# Patient Record
Sex: MALE | Race: WHITE | Employment: FULL TIME | URBAN - METROPOLITAN AREA
[De-identification: names, ages, dates, MRNs, and addresses within clinical notes are randomized per-mention and may not be internally consistent; named-entity substitution may affect disease eponyms.]

---

## 2017-09-25 ENCOUNTER — TELEPHONE (OUTPATIENT)
Dept: FAMILY MEDICINE CLINIC | Facility: CLINIC | Age: 37
End: 2017-09-25

## 2017-09-25 NOTE — TELEPHONE ENCOUNTER
Spoke to patient and received and verified his [employer] [id number] Id # and group #[id number]

## 2018-01-25 ENCOUNTER — OFFICE VISIT (OUTPATIENT)
Dept: FAMILY MEDICINE CLINIC | Facility: CLINIC | Age: 38
End: 2018-01-25

## 2018-01-25 VITALS
DIASTOLIC BLOOD PRESSURE: 80 MMHG | WEIGHT: 225 LBS | HEART RATE: 77 BPM | RESPIRATION RATE: 16 BRPM | TEMPERATURE: 98 F | OXYGEN SATURATION: 98 % | HEIGHT: 72 IN | SYSTOLIC BLOOD PRESSURE: 126 MMHG | BODY MASS INDEX: 30.48 KG/M2

## 2018-01-25 DIAGNOSIS — J02.9 SORE THROAT: ICD-10-CM

## 2018-01-25 DIAGNOSIS — J06.9 VIRAL URI WITH COUGH: Primary | ICD-10-CM

## 2018-01-25 PROCEDURE — 87880 STREP A ASSAY W/OPTIC: CPT | Performed by: PHYSICIAN ASSISTANT

## 2018-01-25 PROCEDURE — 99213 OFFICE O/P EST LOW 20 MIN: CPT | Performed by: PHYSICIAN ASSISTANT

## 2018-01-25 RX ORDER — BENZONATATE 200 MG/1
200 CAPSULE ORAL 3 TIMES DAILY PRN
Qty: 30 CAPSULE | Refills: 0 | Status: SHIPPED | OUTPATIENT
Start: 2018-01-25 | End: 2018-02-04

## 2018-01-25 NOTE — PATIENT INSTRUCTIONS
-Cool mist humidifier at night  -Warm tea with honey  -Mucinex-D  -Flonase  -Motrin for pain or fever  -go to ER with any worsening symptoms            Viral Upper Respiratory Illness (Adult)  You have a viral upper respiratory illness (URI), which is anot Follow-up care  Follow up with your healthcare provider, or as advised.   When to seek medical advice  Call your healthcare provider right away if any of these occur:  · Cough with lots of colored sputum (mucus)  · Severe headache; face, neck, or ear pain

## 2018-01-25 NOTE — PROGRESS NOTES
CHIEF COMPLAINT:   Patient presents with:  Chest Congestion: x 4 days, cough, nasal congestion, pt's son has croup dx 3 days ago      HPI:   Rajendra Patterson is a 44year old male who presents for URI sxs for  3-4 days.   Patient reports body aches, swe GI: denies N/V/C or abdominal pain  NEURO: + slight headaches    EXAM:   /80 (BP Location: Right arm, Patient Position: Sitting, Cuff Size: large)   Pulse 77   Temp 98 °F (36.7 °C) (Oral)   Resp 16   Ht 72\"   Wt 225 lb   SpO2 98%   BMI 30.52 kg/m² -Motrin for pain or fever  -go to ER with any worsening symptoms            Viral Upper Respiratory Illness (Adult)  You have a viral upper respiratory illness (URI), which is another term for the common cold.  This illness is contagious during the first fe When to seek medical advice  Call your healthcare provider right away if any of these occur:  · Cough with lots of colored sputum (mucus)  · Severe headache; face, neck, or ear pain  · Difficulty swallowing due to throat pain  · Fever of 100.4°F (38°C) or

## 2018-03-05 LAB — CONTROL LINE PRESENT WITH A CLEAR BACKGROUND (YES/NO): YES YES/NO

## 2018-05-16 ENCOUNTER — HOSPITAL ENCOUNTER (EMERGENCY)
Age: 38
Discharge: HOME | End: 2018-05-16
Payer: COMMERCIAL

## 2018-05-16 ENCOUNTER — APPOINTMENT (OUTPATIENT)
Dept: GENERAL RADIOLOGY | Age: 38
End: 2018-05-16
Attending: NURSE PRACTITIONER
Payer: COMMERCIAL

## 2018-05-16 VITALS
TEMPERATURE: 99 F | WEIGHT: 220 LBS | HEART RATE: 72 BPM | HEIGHT: 73 IN | OXYGEN SATURATION: 99 % | SYSTOLIC BLOOD PRESSURE: 161 MMHG | RESPIRATION RATE: 16 BRPM | DIASTOLIC BLOOD PRESSURE: 88 MMHG | BODY MASS INDEX: 29.16 KG/M2

## 2018-05-16 DIAGNOSIS — S43.002A SUBLUXATION OF LEFT SHOULDER JOINT, INITIAL ENCOUNTER: Primary | ICD-10-CM

## 2018-05-16 PROCEDURE — 73050 X-RAY EXAM OF SHOULDERS: CPT | Performed by: NURSE PRACTITIONER

## 2018-05-16 PROCEDURE — 73030 X-RAY EXAM OF SHOULDER: CPT | Performed by: NURSE PRACTITIONER

## 2018-05-16 PROCEDURE — 99283 EMERGENCY DEPT VISIT LOW MDM: CPT

## 2018-05-16 RX ORDER — IBUPROFEN 400 MG/1
400 TABLET ORAL EVERY 6 HOURS PRN
COMMUNITY

## 2018-05-16 NOTE — ED PROVIDER NOTES
Patient Seen in: [facility] In [place]    History   Patient presents with:  Upper Extremity Injury (musculoskeletal)    Stated Complaint: L shoulder inj    HPI  Patient is a 71-year-old gentleman who presents with left anterior should Neck: Normal range of motion. Neck supple. Pulmonary/Chest: Effort normal.   Musculoskeletal: He exhibits tenderness (very mild).         Arms:  Left Shoulder Exam:   - Gross deformity: None  - Discoloration: None  - ROM: Normal abduction, flexion, extens

## 2018-05-16 NOTE — ED INITIAL ASSESSMENT (HPI)
Yesterday while helping at son's baseball practice- reached while fielding a ball and fell onto l arm-- now c/o pain to l shoulder

## 2019-11-20 ENCOUNTER — TELEPHONE (OUTPATIENT)
Dept: FAMILY MEDICINE CLINIC | Facility: CLINIC | Age: 39
End: 2019-11-20

## 2019-11-20 NOTE — TELEPHONE ENCOUNTER
LMOM for pt to call back confirming if he will continue care with Dr Tina Bailey ([address] [place] [address] 2/2/15)

## 2020-04-27 ENCOUNTER — TELEPHONE (OUTPATIENT)
Dept: FAMILY MEDICINE CLINIC | Facility: CLINIC | Age: 40
End: 2020-04-27

## 2020-04-27 NOTE — TELEPHONE ENCOUNTER
Tried returning call to Malou Macedo at [phone]. There was no answer and no voice mail has been set up. Called Bipin Piedra at the home number [phone].   There was no answer

## 2020-04-27 NOTE — TELEPHONE ENCOUNTER
Patient's wife called, has questions regarding Covid as someone in patient's office has tested positive, wants to know steps and precautions to take

## 2022-09-26 ENCOUNTER — TELEPHONE (OUTPATIENT)
Dept: FAMILY MEDICINE CLINIC | Facility: CLINIC | Age: 42
End: 2022-09-26

## 2022-09-26 NOTE — TELEPHONE ENCOUNTER
Received fax from [facility] requesting additional information faxed back to them at [phone].  Fax placed in triage

## 2024-02-01 PROBLEM — K58.9 IRRITABLE BOWEL SYNDROME WITHOUT DIARRHEA: Status: ACTIVE | Noted: 2024-02-01

## 2024-02-01 PROBLEM — R10.84 PAIN, ABDOMINAL, GENERALIZED: Status: ACTIVE | Noted: 2024-02-01

## 2024-02-03 ENCOUNTER — TELEPHONE (OUTPATIENT)
Dept: FAMILY MEDICINE CLINIC | Facility: CLINIC | Age: 44
End: 2024-02-03

## 2024-02-03 DIAGNOSIS — I10 ESSENTIAL HYPERTENSION: ICD-10-CM

## 2024-02-03 DIAGNOSIS — Z13.1 SCREENING FOR DIABETES MELLITUS: Primary | ICD-10-CM

## 2024-02-03 DIAGNOSIS — Z13.29 SCREENING FOR THYROID DISORDER: ICD-10-CM

## 2024-02-03 DIAGNOSIS — Z13.6 SCREENING FOR CARDIOVASCULAR CONDITION: ICD-10-CM

## 2024-02-03 DIAGNOSIS — Z13.0 SCREENING FOR IRON DEFICIENCY ANEMIA: ICD-10-CM

## 2024-02-03 DIAGNOSIS — Z11.59 ENCOUNTER FOR HEPATITIS C SCREENING TEST FOR LOW RISK PATIENT: ICD-10-CM

## 2024-02-03 DIAGNOSIS — Z00.00 LABORATORY EXAMINATION ORDERED AS PART OF A ROUTINE GENERAL MEDICAL EXAMINATION: ICD-10-CM

## 2024-02-03 NOTE — TELEPHONE ENCOUNTER
Please enter lab orders for the patient's upcoming physical appointment.     Physical scheduled:   Your appointments       Date & Time Appointment Department ([place])    Mar 08, 2024 11:00 AM CST Adult Physical with Bipin Steven MD [facility] ([facility])    PLEASE NOTE - Most insurances allow a Complete Physical once every 366 days. Please schedule accordingly.    Please arrive 15 minutes prior to your scheduled appointment. Please also bring your Insurance card, Photo ID, and your medication bottles or a list of your current medication.    If you no longer require this appointment, please contact your physician office to cancel.              [facility]  [address] [place] [id number] [phone]           Preferred lab: [facility] ([facility])     The patient has been notified to complete fasting labs prior to their physical appointment.

## 2024-02-03 NOTE — TELEPHONE ENCOUNTER
1. Screening for diabetes mellitus (Primary)  -     Comp Metabolic Panel (14); Future; Expected date: 02/03/2024  2. Screening for iron deficiency anemia  -     CBC With Differential With Platelet; Future; Expected date: 02/03/2024  3. Screening for thyroid disorder  -     TSH W Reflex To Free T4; Future; Expected date: 02/03/2024  4. Screening for cardiovascular condition  -     Lipid Panel; Future; Expected date: 02/03/2024  5. Encounter for hepatitis C screening test for low risk patient  -     HCV Antibody; Future; Expected date: 02/03/2024  6. Laboratory examination ordered as part of a routine general medical examination  -     TSH W Reflex To Free T4; Future; Expected date: 02/03/2024  -     Lipid Panel; Future; Expected date: 02/03/2024  -     CBC With Differential With Platelet; Future; Expected date: 02/03/2024  -     Comp Metabolic Panel (14); Future; Expected date: 02/03/2024  -     HCV Antibody; Future; Expected date: 02/03/2024  7. Essential hypertension  -     TSH W Reflex To Free T4; Future; Expected date: 02/03/2024  -     CBC With Differential With Platelet; Future; Expected date: 02/03/2024  -     Comp Metabolic Panel (14); Future; Expected date: 02/03/2024       OK to notify. Thanks, Jose Antonio Steven MD

## 2024-03-04 ENCOUNTER — LAB ENCOUNTER (OUTPATIENT)
Dept: LAB | Age: 44
End: 2024-03-04
Attending: FAMILY MEDICINE
Payer: COMMERCIAL

## 2024-03-04 DIAGNOSIS — Z13.1 SCREENING FOR DIABETES MELLITUS: ICD-10-CM

## 2024-03-04 DIAGNOSIS — Z11.59 ENCOUNTER FOR HEPATITIS C SCREENING TEST FOR LOW RISK PATIENT: ICD-10-CM

## 2024-03-04 DIAGNOSIS — Z00.00 LABORATORY EXAMINATION ORDERED AS PART OF A ROUTINE GENERAL MEDICAL EXAMINATION: ICD-10-CM

## 2024-03-04 DIAGNOSIS — Z13.6 SCREENING FOR CARDIOVASCULAR CONDITION: ICD-10-CM

## 2024-03-04 DIAGNOSIS — Z13.29 SCREENING FOR THYROID DISORDER: ICD-10-CM

## 2024-03-04 DIAGNOSIS — Z13.0 SCREENING FOR IRON DEFICIENCY ANEMIA: ICD-10-CM

## 2024-03-04 DIAGNOSIS — I10 ESSENTIAL HYPERTENSION: ICD-10-CM

## 2024-03-04 LAB
ALBUMIN SERPL-MCNC: 4.1 G/DL (ref 3.4–5)
ALBUMIN/GLOB SERPL: 1.5 {RATIO} (ref 1–2)
ALP LIVER SERPL-CCNC: 68 U/L
ALT SERPL-CCNC: 31 U/L
ANION GAP SERPL CALC-SCNC: 2 MMOL/L (ref 0–18)
AST SERPL-CCNC: 25 U/L (ref 15–37)
BASOPHILS # BLD AUTO: 0.02 X10(3) UL (ref 0–0.2)
BASOPHILS NFR BLD AUTO: 0.4 %
BILIRUB SERPL-MCNC: 1 MG/DL (ref 0.1–2)
BUN BLD-MCNC: 15 MG/DL (ref 9–23)
CALCIUM BLD-MCNC: 9.1 MG/DL (ref 8.5–10.1)
CHLORIDE SERPL-SCNC: 102 MMOL/L (ref 98–112)
CHOLEST SERPL-MCNC: 213 MG/DL (ref ?–200)
CO2 SERPL-SCNC: 29 MMOL/L (ref 21–32)
CREAT BLD-MCNC: 1.09 MG/DL
EGFRCR SERPLBLD CKD-EPI 2021: 86 ML/MIN/1.73M2 (ref 60–?)
EOSINOPHIL # BLD AUTO: 0.04 X10(3) UL (ref 0–0.7)
EOSINOPHIL NFR BLD AUTO: 0.8 %
ERYTHROCYTE [DISTWIDTH] IN BLOOD BY AUTOMATED COUNT: 12.4 %
FASTING PATIENT LIPID ANSWER: YES
FASTING STATUS PATIENT QL REPORTED: YES
GLOBULIN PLAS-MCNC: 2.7 G/DL (ref 2.8–4.4)
GLUCOSE BLD-MCNC: 100 MG/DL (ref 70–99)
HCT VFR BLD AUTO: 41.8 %
HCV AB SERPL QL IA: NONREACTIVE
HDLC SERPL-MCNC: 46 MG/DL (ref 40–59)
HGB BLD-MCNC: 14.3 G/DL
IMM GRANULOCYTES # BLD AUTO: 0.01 X10(3) UL (ref 0–1)
IMM GRANULOCYTES NFR BLD: 0.2 %
LDLC SERPL CALC-MCNC: 136 MG/DL (ref ?–100)
LYMPHOCYTES # BLD AUTO: 1.08 X10(3) UL (ref 1–4)
LYMPHOCYTES NFR BLD AUTO: 20.3 %
MCH RBC QN AUTO: 30.8 PG (ref 26–34)
MCHC RBC AUTO-ENTMCNC: 34.2 G/DL (ref 31–37)
MCV RBC AUTO: 90.1 FL
MONOCYTES # BLD AUTO: 0.39 X10(3) UL (ref 0.1–1)
MONOCYTES NFR BLD AUTO: 7.3 %
NEUTROPHILS # BLD AUTO: 3.77 X10 (3) UL (ref 1.5–7.7)
NEUTROPHILS # BLD AUTO: 3.77 X10(3) UL (ref 1.5–7.7)
NEUTROPHILS NFR BLD AUTO: 71 %
NONHDLC SERPL-MCNC: 167 MG/DL (ref ?–130)
OSMOLALITY SERPL CALC.SUM OF ELEC: 277 MOSM/KG (ref 275–295)
PLATELET # BLD AUTO: 203 10(3)UL (ref 150–450)
POTASSIUM SERPL-SCNC: 4.1 MMOL/L (ref 3.5–5.1)
PROT SERPL-MCNC: 6.8 G/DL (ref 6.4–8.2)
RBC # BLD AUTO: 4.64 X10(6)UL
SODIUM SERPL-SCNC: 133 MMOL/L (ref 136–145)
TRIGL SERPL-MCNC: 171 MG/DL (ref 30–149)
TSI SER-ACNC: 1.12 MIU/ML (ref 0.36–3.74)
VLDLC SERPL CALC-MCNC: 32 MG/DL (ref 0–30)
WBC # BLD AUTO: 5.3 X10(3) UL (ref 4–11)

## 2024-03-04 PROCEDURE — 86803 HEPATITIS C AB TEST: CPT | Performed by: FAMILY MEDICINE

## 2024-03-04 PROCEDURE — 80061 LIPID PANEL: CPT | Performed by: FAMILY MEDICINE

## 2024-03-04 PROCEDURE — 80050 GENERAL HEALTH PANEL: CPT | Performed by: FAMILY MEDICINE

## 2024-03-08 ENCOUNTER — OFFICE VISIT (OUTPATIENT)
Dept: FAMILY MEDICINE CLINIC | Facility: CLINIC | Age: 44
End: 2024-03-08
Payer: COMMERCIAL

## 2024-03-08 VITALS
SYSTOLIC BLOOD PRESSURE: 128 MMHG | WEIGHT: 231.13 LBS | HEART RATE: 82 BPM | BODY MASS INDEX: 30.63 KG/M2 | DIASTOLIC BLOOD PRESSURE: 86 MMHG | HEIGHT: 73 IN | RESPIRATION RATE: 16 BRPM

## 2024-03-08 DIAGNOSIS — Z00.00 ANNUAL PHYSICAL EXAM: Primary | ICD-10-CM

## 2024-03-08 DIAGNOSIS — I10 ESSENTIAL HYPERTENSION: ICD-10-CM

## 2024-03-08 DIAGNOSIS — F41.1 GAD (GENERALIZED ANXIETY DISORDER): ICD-10-CM

## 2024-03-08 RX ORDER — LORAZEPAM 0.5 MG/1
0.5 TABLET ORAL EVERY 6 HOURS PRN
Qty: 20 TABLET | Refills: 1 | Status: SHIPPED | OUTPATIENT
Start: 2024-03-08

## 2024-03-08 NOTE — PATIENT INSTRUCTIONS
Treating Insomnia     Learning to relax before bedtime can improve your sleep.   Good sleeping habits are a key part of treatment. If needed, some medicines may help you sleep better at first. Making healthy lifestyle changes and learning to relax can improve your sleep. Treating insomnia takes commitment. But trust that your efforts will pay off. Be sure to talk with your healthcare provider before taking any medicine.  Healthy lifestyle  Your lifestyle affects your health and your sleep. Here are some healthy habits:  Keep a regular sleep schedule. Go to bed and get up at the same time each day.  Exercise regularly. It may help you reduce stress. Avoid strenuous exercise for 2 to 4 hours before bedtime.  Avoid or limit naps, especially in the late afternoon.  Use your bed only for sleep and sex.  Don’t spend too much time in bed trying to fall asleep. If you can’t fall asleep, get up and do something (no electronics) until you become tired and drowsy.  Avoid or limit caffeine and nicotine for up to 6 hours before bedtime. They can keep you awake at night.   Also avoid alcohol for at least 4 to 6 hours before bedtime. It may help you fall asleep at first. But you will have more awakenings during the night. And your sleep will not be restful.  Before bedtime  To sleep better every night, try these tips:  Have a bedtime routine to let your body and mind know when it’s time to sleep.  Think of going to bed as relaxing and enjoyable. Sleep will come sooner.  If your worries don’t let you sleep, write them down in a diary. Then close it, and go to bed.  Make sure the room is not too hot or too cold. If it’s not dark enough, an eye mask can help. If it’s noisy, try using earplugs.  Don't eat a large meal just before bedtime.  Remove noises, bright lights, TVs, cell phones, and computers from your sleeping environment.  Use a comfortable mattress and pillow.  Learn to relax  Stress, anxiety, and body tension may keep  you awake at night. To unwind before bedtime, try a warm bath, meditation, or yoga. Also try the following:  Deep breathing. Sit or lie back in a chair. Take a slow, deep breath. Hold it for 5 counts. Then breathe out slowly through your mouth. Keep doing this until you feel relaxed.  Progressive muscle relaxation. Tense and then relax the muscles in your body as you breathe deeply. Start with your feet and work up your body to your neck and face.  Date Last Reviewed: 8/1/2017  © 6831-9921 [employer]. [address] [place] [address]. All rights reserved. This information is not intended as a substitute for professional medical care. Always follow your healthcare professional's instructions.

## 2024-03-08 NOTE — ASSESSMENT & PLAN NOTE
Last K was 4.1 done on 3/4/2024.  Last Cr was 1.09 done on 3/4/2024.  Last eGFR was 86 on 3/4/2024.

## 2024-03-08 NOTE — PROGRESS NOTES
Javi Pemberton is a 43 year old male who presents for a complete physical exam.     had concerns including Physical (Annual /), Lab (Would like to check and discuss blood work results for PSA ), and Anxiety (Would like to start discuss and see what are options ).   No topic due editable text      Subjective:    He complains of anxiety continues. 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     Tobacco:  He smoked tobacco in the past but quit greater than 12 months ago.  Social History    Tobacco Use      Smoking status: Former        Packs/day: 0.50        Years: 4.00        Additional pack years: 0.00        Total pack years: 2.00        Types: Cigarettes        Quit date: 2020        Years since quittin.1      Smokeless tobacco: Never        Wt Readings from Last 4 Encounters:   24 231 lb 1.6 oz (104.8 kg)   24 225 lb (102.1 kg)   23 222 lb (100.7 kg)   23 222 lb (100.7 kg)     Body mass index is 30.49 kg/m².     The 10-year ASCVD risk score (Will HENDERSON, et al., 2019) is: 2.2%    Values used to calculate the score:      Age: 43 years      Sex: Male      Is Non- : No      Diabetic: No      Tobacco smoker: No      Systolic Blood Pressure: 128 mmHg      Is BP treated: No      HDL Cholesterol: 46 mg/dL      Total Cholesterol: 213 mg/dL    Chief Complaint Reviewed and Verified  Nursing Notes Reviewed and   Verified  Tobacco Reviewed  Allergies Reviewed  Medications Reviewed    Problem List Reviewed  Medical History Reviewed  Surgical History   Reviewed  Family History Reviewed          His family history includes Hypertension in his mother; Prostate Cancer in his father.   He  reports that he quit smoking about 4 years ago. His smoking use included cigarettes. He has a 2 pack-year smoking history. He has never used smokeless tobacco. He reports current alcohol use of about 6.0 standard drinks of alcohol per week.  He reports that he does not use drugs.    Exercise: three times per week.  Diet: watches minimally    Health Maintenance   Topic Date Due    PSA  Never done      No results found for this or any previous visit.     Health Maintenance   Topic Date Due    Colorectal Cancer Screening  02/01/2034      Pneumococcal Vaccination(1 of 2 - PCV) Never done   Health Maintenance Due   Topic Date Due    Annual Physical  Never done    Pneumococcal Vaccine: Birth to 64yrs (1 of 2 - PCV) Never done    Zoster Vaccines (1 of 2) Never done    DTaP,Tdap,and Td Vaccines (3 - Tdap) 09/11/2013    COVID-19 Vaccine (4 - 2023-24 season) 09/01/2023    Influenza Vaccine (1) 10/01/2023    Annual Depression Screening  Never done         Review of Systems   Constitutional: Negative.  Negative for activity change, appetite change, chills and fever.   HENT: Negative.     Eyes: Negative.    Respiratory: Negative.  Negative for shortness of breath.    Cardiovascular: Negative.  Negative for chest pain and palpitations.   Gastrointestinal: Negative.  Negative for abdominal pain.   Genitourinary: Negative.  Negative for dysuria.   Musculoskeletal:  Negative for arthralgias.   Skin: Negative.  Negative for rash.   Allergic/Immunologic: Negative.    Neurological: Negative.         Results:    Lab Results   Component Value Date/Time    WBC 5.3 03/04/2024 11:04 AM    HGB 14.3 03/04/2024 11:04 AM    .0 03/04/2024 11:04 AM      Lab Results   Component Value Date/Time     (H) 03/04/2024 11:04 AM     (L) 03/04/2024 11:04 AM    K 4.1 03/04/2024 11:04 AM     03/04/2024 11:04 AM    CO2 29.0 03/04/2024 11:04 AM    CREATSERUM 1.09 03/04/2024 11:04 AM    CA 9.1 03/04/2024 11:04 AM    ALB 4.1 03/04/2024 11:04 AM    TP 6.8 03/04/2024 11:04 AM    ALKPHO 68 03/04/2024 11:04 AM    AST 25 03/04/2024 11:04 AM    ALT 31 03/04/2024 11:04 AM    BILT 1.0 03/04/2024 11:04 AM    TSH 1.120 03/04/2024 11:04 AM        Lab Results   Component Value  Date/Time    CHOLEST 213 (H) 03/04/2024 11:04 AM    HDL 46 03/04/2024 11:04 AM    TRIG 171 (H) 03/04/2024 11:04 AM     (H) 03/04/2024 11:04 AM    NONHDLC 167 (H) 03/04/2024 11:04 AM       Last A1c value was  % done  .     Vitamin D:      No results found for: \"VITD\"       Objective:    EXAM:  /86   Pulse 82   Resp 16   Ht 6' 1\" (1.854 m)   Wt 231 lb 1.6 oz (104.8 kg)   BMI 30.49 kg/m²  Estimated body mass index is 30.49 kg/m² as calculated from the following:    Height as of this encounter: 6' 1\" (1.854 m).    Weight as of this encounter: 231 lb 1.6 oz (104.8 kg).   Physical Exam  Vitals and nursing note reviewed.   Constitutional:       General: He is not in acute distress.     Appearance: Normal appearance.   HENT:      Head: Normocephalic and atraumatic.      Right Ear: Tympanic membrane and external ear normal.      Left Ear: Tympanic membrane and external ear normal.      Nose: Nose normal.      Mouth/Throat:      Mouth: Mucous membranes are moist.   Eyes:      Extraocular Movements: Extraocular movements intact.      Pupils: Pupils are equal, round, and reactive to light.   Cardiovascular:      Rate and Rhythm: Normal rate and regular rhythm.      Pulses: Normal pulses.           Carotid pulses are 2+ on the right side and 2+ on the left side.       Radial pulses are 2+ on the right side and 2+ on the left side.        Dorsalis pedis pulses are 2+ on the right side and 2+ on the left side.        Posterior tibial pulses are 2+ on the right side and 2+ on the left side.      Heart sounds: Normal heart sounds, S1 normal and S2 normal. No murmur heard.  Pulmonary:      Effort: Pulmonary effort is normal.      Breath sounds: Normal breath sounds.   Abdominal:      General: Abdomen is flat. Bowel sounds are normal. There is no distension.      Palpations: Abdomen is soft.   Musculoskeletal:         General: Normal range of motion.      Cervical back: Normal range of motion and neck supple.       Right lower leg: No edema.      Left lower leg: No edema.   Skin:     General: Skin is warm and dry.      Capillary Refill: Capillary refill takes less than 2 seconds.   Neurological:      General: No focal deficit present.      Mental Status: He is alert and oriented to person, place, and time.   Psychiatric:         Mood and Affect: Mood normal.         Behavior: Behavior normal.         Thought Content: Thought content normal.          Assessment & Plan:    Javi Pemberton is a 43 year old male who presents for a complete physical exam.   Pt's weight is Body mass index is 30.49 kg/m²., recommended low fat diet and aerobic exercise 30 minutes three times weekly.   Health maintenance, Up to date    Immunizations: Up to date   Immunization History   Administered Date(s) Administered    >=3 YRS QUAD MULTIDOSE VIAL (82178) FLU CLINIC 12/05/2016    Covid-19 Vaccine Pfizer 30 mcg/0.3 ml 04/12/2021, 05/04/2021, 12/07/2021    DT 09/11/2003    FLUZONE 6 months and older PFS 0.5 ml (99501) 11/19/2020, 11/18/2021    Influenza 11/22/1999, 11/22/1999, 01/01/2015, 12/12/2017, 12/14/2020    Kenalog Per 10mg Inj 07/29/2020, 01/07/2021, 10/07/2021    Meningococcal Vaccine 11/22/1999    TD 09/11/2003         Pt info given for: exercise, low fat diet, The patient indicates understanding of these issues and agrees to the plan.  The patient is asked to return for CPX in 1 years.    Assessment:  1. Annual physical exam (Primary)  -     CT CALCIUM SCORING; Future; Expected date: 03/08/2024  2. Essential hypertension  Overview:  No meds  Assessment & Plan:  Last K was 4.1 done on 3/4/2024.  Last Cr was 1.09 done on 3/4/2024.  Last eGFR was 86 on 3/4/2024.   3. OLGA (generalized anxiety disorder)  -     LORazepam; Take 1 tablet (0.5 mg total) by mouth every 6 (six) hours as needed for Anxiety.  Dispense: 20 tablet; Refill: 1  He is not interested in antidepressants at this time but will give him a very small amount of lorazepam to use  for more severe anxiety and discussed counseling option.  Tips are given and follow-up this summer because of elevated pressures.    I have discontinued Javi Pemberton's polyethylene glycol (PEG 3350-KCl-NaBcb-NaCl-NaSulf) and PEG 3350-KCl-Na Bicarb-NaCl. I am also having him start on LORazepam. Additionally, I am having him maintain his ibuprofen, nortriptyline, and dicyclomine.     Return in about 6 months (around 9/8/2024) for recheck.

## 2024-05-21 DIAGNOSIS — F41.1 GAD (GENERALIZED ANXIETY DISORDER): ICD-10-CM

## 2024-05-28 NOTE — TELEPHONE ENCOUNTER
Refill request for:    Requested Prescriptions     Pending Prescriptions Disp Refills    LORAZEPAM 0.5 MG Oral Tab [Pharmacy Med Name: LORAZEPAM 0.5MG TABLETS] 20 tablet 0     Sig: TAKE 1 TABLET(0.5 MG) BY MOUTH EVERY 6 HOURS AS NEEDED FOR ANXIETY        Last Prescribed Quantity Refills   3/8/24 30 1     LOV 3/8/2024     Patient was asked to follow-up in: 6 months    Appointment due: September 2024    Appointment scheduled: 6/5/2024 Bipin Steven MD    Medication not on protocol.     Routed to Dr Steven.

## 2024-05-29 RX ORDER — LORAZEPAM 0.5 MG/1
0.5 TABLET ORAL EVERY 6 HOURS PRN
Qty: 20 TABLET | Refills: 0 | Status: SHIPPED | OUTPATIENT
Start: 2024-05-29

## 2024-06-03 PROBLEM — R10.84 PAIN, ABDOMINAL, GENERALIZED: Status: RESOLVED | Noted: 2024-02-01 | Resolved: 2024-06-03

## 2024-06-21 PROBLEM — F41.1 GAD (GENERALIZED ANXIETY DISORDER): Status: ACTIVE | Noted: 2024-06-21

## 2025-01-21 ENCOUNTER — PATIENT MESSAGE (OUTPATIENT)
Dept: FAMILY MEDICINE CLINIC | Facility: CLINIC | Age: 45
End: 2025-01-21

## 2025-01-21 NOTE — TELEPHONE ENCOUNTER
Do we have the correct insurance? It may not have been updated for the year. [employer] PPO should not need referral issue, but it may be something different

## 2025-01-22 ENCOUNTER — PATIENT MESSAGE (OUTPATIENT)
Dept: FAMILY MEDICINE CLINIC | Facility: CLINIC | Age: 45
End: 2025-01-22

## 2025-01-22 DIAGNOSIS — M25.511 RIGHT SHOULDER PAIN, UNSPECIFIED CHRONICITY: Primary | ICD-10-CM

## 2025-01-23 NOTE — TELEPHONE ENCOUNTER
Can you enter new insurance card and if we are in network  Then send back to me regarding referral

## 2025-01-27 NOTE — TELEPHONE ENCOUNTER
Patient was able to give us a reference number to change his number to [phone] and to change his insurance to [facility].   Patient needing a referral to Dr. Mera office

## 2025-02-03 ENCOUNTER — LAB ENCOUNTER (OUTPATIENT)
Dept: LAB | Age: 45
End: 2025-02-03
Attending: FAMILY MEDICINE
Payer: COMMERCIAL

## 2025-02-03 DIAGNOSIS — Z12.5 SCREENING PSA (PROSTATE SPECIFIC ANTIGEN): ICD-10-CM

## 2025-02-03 DIAGNOSIS — Z00.00 LABORATORY EXAMINATION ORDERED AS PART OF A ROUTINE GENERAL MEDICAL EXAMINATION: ICD-10-CM

## 2025-02-03 DIAGNOSIS — Z13.6 SCREENING FOR CARDIOVASCULAR CONDITION: ICD-10-CM

## 2025-02-03 DIAGNOSIS — Z13.1 SCREENING FOR DIABETES MELLITUS: ICD-10-CM

## 2025-02-03 DIAGNOSIS — Z13.0 SCREENING FOR IRON DEFICIENCY ANEMIA: ICD-10-CM

## 2025-02-03 DIAGNOSIS — I10 ESSENTIAL HYPERTENSION: ICD-10-CM

## 2025-02-03 DIAGNOSIS — Z13.29 SCREENING FOR THYROID DISORDER: ICD-10-CM

## 2025-02-03 LAB
ALBUMIN SERPL-MCNC: 4.6 G/DL (ref 3.2–4.8)
ALBUMIN/GLOB SERPL: 2 {RATIO} (ref 1–2)
ALP LIVER SERPL-CCNC: 67 U/L
ALT SERPL-CCNC: 37 U/L
ANION GAP SERPL CALC-SCNC: 8 MMOL/L (ref 0–18)
AST SERPL-CCNC: 26 U/L (ref ?–34)
BASOPHILS # BLD AUTO: 0.05 X10(3) UL (ref 0–0.2)
BASOPHILS NFR BLD AUTO: 0.9 %
BILIRUB SERPL-MCNC: 1 MG/DL (ref 0.3–1.2)
BUN BLD-MCNC: 18 MG/DL (ref 9–23)
CALCIUM BLD-MCNC: 9.5 MG/DL (ref 8.7–10.6)
CHLORIDE SERPL-SCNC: 99 MMOL/L (ref 98–112)
CHOLEST SERPL-MCNC: 233 MG/DL (ref ?–200)
CO2 SERPL-SCNC: 28 MMOL/L (ref 21–32)
COMPLEXED PSA SERPL-MCNC: 0.74 NG/ML (ref ?–4)
CREAT BLD-MCNC: 1 MG/DL
EGFRCR SERPLBLD CKD-EPI 2021: 95 ML/MIN/1.73M2 (ref 60–?)
EOSINOPHIL # BLD AUTO: 0.24 X10(3) UL (ref 0–0.7)
EOSINOPHIL NFR BLD AUTO: 4.2 %
ERYTHROCYTE [DISTWIDTH] IN BLOOD BY AUTOMATED COUNT: 12.1 %
FASTING PATIENT LIPID ANSWER: NO
FASTING STATUS PATIENT QL REPORTED: NO
GLOBULIN PLAS-MCNC: 2.3 G/DL (ref 2–3.5)
GLUCOSE BLD-MCNC: 94 MG/DL (ref 70–99)
HCT VFR BLD AUTO: 42.1 %
HDLC SERPL-MCNC: 46 MG/DL (ref 40–59)
HGB BLD-MCNC: 14.7 G/DL
IMM GRANULOCYTES # BLD AUTO: 0.01 X10(3) UL (ref 0–1)
IMM GRANULOCYTES NFR BLD: 0.2 %
LDLC SERPL CALC-MCNC: 118 MG/DL (ref ?–100)
LYMPHOCYTES # BLD AUTO: 1.19 X10(3) UL (ref 1–4)
LYMPHOCYTES NFR BLD AUTO: 21 %
MCH RBC QN AUTO: 31.2 PG (ref 26–34)
MCHC RBC AUTO-ENTMCNC: 34.9 G/DL (ref 31–37)
MCV RBC AUTO: 89.4 FL
MONOCYTES # BLD AUTO: 0.37 X10(3) UL (ref 0.1–1)
MONOCYTES NFR BLD AUTO: 6.5 %
NEUTROPHILS # BLD AUTO: 3.81 X10 (3) UL (ref 1.5–7.7)
NEUTROPHILS # BLD AUTO: 3.81 X10(3) UL (ref 1.5–7.7)
NEUTROPHILS NFR BLD AUTO: 67.2 %
NONHDLC SERPL-MCNC: 187 MG/DL (ref ?–130)
OSMOLALITY SERPL CALC.SUM OF ELEC: 282 MOSM/KG (ref 275–295)
PLATELET # BLD AUTO: 219 10(3)UL (ref 150–450)
POTASSIUM SERPL-SCNC: 4.2 MMOL/L (ref 3.5–5.1)
PROT SERPL-MCNC: 6.9 G/DL (ref 5.7–8.2)
RBC # BLD AUTO: 4.71 X10(6)UL
SODIUM SERPL-SCNC: 135 MMOL/L (ref 136–145)
TRIGL SERPL-MCNC: 395 MG/DL (ref 30–149)
TSI SER-ACNC: 0.86 UIU/ML (ref 0.55–4.78)
VLDLC SERPL CALC-MCNC: 71 MG/DL (ref 0–30)
WBC # BLD AUTO: 5.7 X10(3) UL (ref 4–11)

## 2025-02-03 PROCEDURE — 80053 COMPREHEN METABOLIC PANEL: CPT

## 2025-02-03 PROCEDURE — 85025 COMPLETE CBC W/AUTO DIFF WBC: CPT

## 2025-02-03 PROCEDURE — 84443 ASSAY THYROID STIM HORMONE: CPT

## 2025-02-03 PROCEDURE — 36415 COLL VENOUS BLD VENIPUNCTURE: CPT

## 2025-02-03 PROCEDURE — 80061 LIPID PANEL: CPT

## 2025-02-07 ENCOUNTER — OFFICE VISIT (OUTPATIENT)
Facility: CLINIC | Age: 45
End: 2025-02-07
Payer: COMMERCIAL

## 2025-02-07 ENCOUNTER — TELEPHONE (OUTPATIENT)
Facility: CLINIC | Age: 45
End: 2025-02-07

## 2025-02-07 ENCOUNTER — HOSPITAL ENCOUNTER (OUTPATIENT)
Dept: GENERAL RADIOLOGY | Age: 45
Discharge: HOME | End: 2025-02-07
Attending: FAMILY MEDICINE
Payer: COMMERCIAL

## 2025-02-07 VITALS — BODY MASS INDEX: 30.35 KG/M2 | WEIGHT: 229 LBS | HEIGHT: 73 IN

## 2025-02-07 DIAGNOSIS — M75.101 ROTATOR CUFF SYNDROME, RIGHT: Primary | ICD-10-CM

## 2025-02-07 DIAGNOSIS — M25.511 ACUTE PAIN OF RIGHT SHOULDER: Primary | ICD-10-CM

## 2025-02-07 DIAGNOSIS — M25.512 ACUTE PAIN OF LEFT SHOULDER: ICD-10-CM

## 2025-02-07 DIAGNOSIS — Z98.890 S/P ARTHROSCOPY OF RIGHT SHOULDER: ICD-10-CM

## 2025-02-07 DIAGNOSIS — M25.511 ACUTE PAIN OF RIGHT SHOULDER: ICD-10-CM

## 2025-02-07 PROCEDURE — 73030 X-RAY EXAM OF SHOULDER: CPT | Performed by: FAMILY MEDICINE

## 2025-02-07 PROCEDURE — 99204 OFFICE O/P NEW MOD 45 MIN: CPT | Performed by: FAMILY MEDICINE

## 2025-02-07 PROCEDURE — 3008F BODY MASS INDEX DOCD: CPT | Performed by: FAMILY MEDICINE

## 2025-02-07 RX ORDER — CYCLOBENZAPRINE HCL 10 MG
10 TABLET ORAL NIGHTLY
Qty: 30 TABLET | Refills: 0 | Status: SHIPPED | OUTPATIENT
Start: 2025-02-07 | End: 2025-03-09

## 2025-02-07 RX ORDER — MELOXICAM 15 MG/1
15 TABLET ORAL DAILY
Qty: 30 TABLET | Refills: 0 | Status: SHIPPED | OUTPATIENT
Start: 2025-02-07 | End: 2025-03-09

## 2025-02-11 NOTE — H&P
Sports Medicine Clinic Note    Subjective:    Chief Complaint: Right shoulder pain    History: 44-year-old RHD patient presents with right shoulder pain for the past four weeks. Was pitching with his children and did a lot of repetitive overhead movements/throws that day 4 weeks ago which triggered the episode. Reports pain with overhead activities and lifting. History of right shoulder biceps tenodesis and rotator cuff debridement in 2023, with ongoing discomfort since the procedure. Denies systemic symptoms, numbness, or tingling.    Objective:    Right Shoulder Examination:    Inspection: No gross asymmetry, deformity, or muscle atrophy.  Palpation: No tenderness over AC joint, subacromial bursa, biceps tendon, or greater tuberosity.  Range of Motion:  Active: Forward flexion 0-160°, abduction 0-160°, external rotation 50°, internal rotation to mid-thoracic spine.  Passive: Forward flexion 0-160°, abduction 0-160°, external rotation 50°, internal rotation to mid-thoracic spine.  Neurovascular: Sensation intact to light touch in axillary, radial, ulnar, median, and musculocutaneous nerve distributions. Strength 5/5 in biceps, triceps, and deltoid. 2+ radial pulse with brisk capillary refill.  Special Tests:  Impingement: Neer (+), Marcial (+)  Rotator Cuff: Empty Can (+), Lift Off (+), Drop Arm (-), ER Lag (-), IR Lag (-)  AC Joint: Scarf (-)  Biceps: Adenrgason (-), Speed’s (+)  Labrum: O’Tj’s (-), Apprehension (-)  Cervical Radiculopathy: Spurling (-)    Diagnostic Tests:    Radiographs of the right shoulder personally reviewed. No fracture or dislocation. No significant osteophyte formation or joint space narrowing.    Assessment:    Right shoulder rotator cuff syndrome.    Plan:    Additional Workup: No further imaging needed at this time. MRI may be considered if symptoms persist.  Therapy: Referral to physical therapy for rotator cuff strengthening and mobility exercises.  Medications: Acetaminophen and  NSAIDs as needed for pain control. Flexeril for night time symptoms.  Activity Recommendations: Avoid overhead activities that exacerbate symptoms.  Procedures: Consider corticosteroid injection at follow-up if symptoms persist.    Follow-up: Tentatively scheduled in 6-8 weeks to reassess symptoms and determine next steps.      Diaz Bull DO, CAQSM   Primary Care Sports Medicine

## 2025-03-10 RX ORDER — MELOXICAM 15 MG/1
15 TABLET ORAL DAILY
Qty: 30 TABLET | Refills: 0 | Status: SHIPPED | OUTPATIENT
Start: 2025-03-10

## 2025-03-10 NOTE — TELEPHONE ENCOUNTER
Meloxicam 15 mg  DOS: n/a  Last OV: 2/7/25  Last refill date: 2/7/25     #/refills: 30/0  Upcoming appt: No future appointments.    2/3/25  BUN  9 - 23 mg/dL 18   Creatinine  0.70 - 1.30 mg/dL 1.00     eGFR-Cr  >=60 mL/min/1.73m2 95

## 2025-04-08 RX ORDER — MELOXICAM 15 MG/1
15 TABLET ORAL DAILY
Qty: 30 TABLET | Refills: 0 | Status: SHIPPED | OUTPATIENT
Start: 2025-04-08

## 2025-04-08 NOTE — TELEPHONE ENCOUNTER
Meloxicam 15 mg   DOS: N/A  Last OV: 02/7/25  Last refill date: 03/10/25     #/refills: 30/0  Upcoming appt: No future appointments.    02/03/25  BUN  9 - 23 mg/dL 18   Creatinine  0.70 - 1.30 mg/dL 1.00     eGFR-Cr  >=60 mL/min/1.73m2 95

## 2025-05-20 ENCOUNTER — OFFICE VISIT (OUTPATIENT)
Dept: FAMILY MEDICINE CLINIC | Facility: CLINIC | Age: 45
End: 2025-05-20
Payer: COMMERCIAL

## 2025-05-20 VITALS
DIASTOLIC BLOOD PRESSURE: 120 MMHG | SYSTOLIC BLOOD PRESSURE: 144 MMHG | RESPIRATION RATE: 20 BRPM | TEMPERATURE: 99 F | BODY MASS INDEX: 30 KG/M2 | HEART RATE: 92 BPM | OXYGEN SATURATION: 98 % | WEIGHT: 226.81 LBS

## 2025-05-20 DIAGNOSIS — H92.01 EARACHE ON RIGHT: Primary | ICD-10-CM

## 2025-05-20 PROCEDURE — 3080F DIAST BP >= 90 MM HG: CPT | Performed by: NURSE PRACTITIONER

## 2025-05-20 PROCEDURE — 3077F SYST BP >= 140 MM HG: CPT | Performed by: NURSE PRACTITIONER

## 2025-05-20 PROCEDURE — 99213 OFFICE O/P EST LOW 20 MIN: CPT | Performed by: NURSE PRACTITIONER

## 2025-05-21 NOTE — PROGRESS NOTES
CHIEF COMPLAINT:     Chief Complaint   Patient presents with    Ear Problem     possible cotton stuck in ear for q tip - Entered by patient  S/s for 3 days, mild pain. OTC ear drops used.        HPI:   Javi Pemberton is a 44 year old male who presents to clinic today with complaints of mild right ear pain. Has had for 3  days. Pain is described as ache/irritation.  Patient denies history of ear infections. Home treatment includes irrigation, Debrox.  Patient thought he may have some cotton from a Q tip stuck in his ear canal because he noted a Q tip he had used without cotton on the end.     Associated symptoms:  Patient denies decreased hearing. Patient denies hearing loss. Patient denies drainage. Patient reports use of cotton tipped ear swabs to clean the ears. Patient reports following URI symptoms: none    Current Medications[1]   Past Medical History[2]   Social History:  Short Social Hx on File[3]     REVIEW OF SYSTEMS:   GENERAL: See HPI  SKIN: no unusual skin lesions or rashes  HEENT: See HPI  LUNGS: No shortness of breath, or wheezing.  CARDIOVASCULAR: No chest pain, palpitations  GI: No N/V/C/D.  NEURO: denies headaches or dizziness    EXAM:   BP (!) 144/120   Pulse 92   Temp 98.6 °F (37 °C) (Oral)   Resp 20   Wt 226 lb 12.8 oz (102.9 kg)   SpO2 98%   BMI 29.92 kg/m²   GENERAL: well developed, well nourished,in no apparent distress  SKIN: no rashes,no suspicious lesions  HEAD: atraumatic, normocephalic  EYES: conjunctiva clear, EOM intact  EARS: bilateral tragus not tender with manipulation.  External auditory canals clear, no cotton or FB.  No cerumen. Right TM: without erythema, no bulging, no retraction,no effusion, bony landmarks visible.  Left TM: without erythema, no bulging, no retraction,no effusion, bony landmarks visible.  NECK: supple, non-tender  LUNGS: clear to auscultation bilaterally, no wheezes or rhonchi. Breathing is non labored.  CARDIO: RRR without murmur  EXTREMITIES: no  cyanosis, clubbing or edema  LYMPH: no cervical lymphadenopathy.      ASSESSMENT AND PLAN:   Javi Pemberton is a 44 year old male who presents with ear problems symptoms are consistent with    ASSESSMENT:  Encounter Diagnosis   Name Primary?    Earache on right Yes       PLAN: Discussed negative exam.  Follow up if no improvement.    Meds & Refills for this Visit:  Requested Prescriptions      No prescriptions requested or ordered in this encounter         Risk and benefits of medication discussed.     Acetaminophen or NSAID prn pain.      Follow up with PCP if s/sx worsen, do not begin to improve in 3 days, or if fever of 100.4 or greater persists for 72 hours.      Patient voiced understand and is in agreement with treatment plan.    There are no Patient Instructions on file for this visit.           [1]   Current Outpatient Medications   Medication Sig Dispense Refill    MELOXICAM 15 MG Oral Tab TAKE 1 TABLET(15 MG) BY MOUTH DAILY 30 tablet 0    sertraline 50 MG Oral Tab Take 1 tablet (50 mg total) by mouth daily. 90 tablet 0    LORazepam 1 MG Oral Tab Take 1.5 tablets (1.5 mg total) by mouth every 6 (six) hours as needed for Anxiety. 135 tablet 2    dicyclomine 10 MG Oral Cap Take 1 capsule (10 mg total) by mouth 3 (three) times daily as needed. 270 capsule 3    nortriptyline 50 MG Oral Cap Take 1 capsule (50 mg total) by mouth nightly. 90 capsule 3    ibuprofen 400 MG Oral Tab Take 1 tablet (400 mg total) by mouth every 6 (six) hours as needed for Pain.     [2]   Past Medical History:   Bloating    Decorative tattoo    Diarrhea, unspecified    Flatulence/gas pain/belching    Heartburn    PERIODIC    Indigestion    PERIODIC    Irregular bowel habits    Personal history of antineoplastic chemotherapy    one tx 15 years ago    Testicle cancer (HCC)   [3]   Social History  Socioeconomic History    Marital status:    Tobacco Use    Smoking status: Former     Current packs/day: 0.00     Average  packs/day: 0.5 packs/day for 4.0 years (2.0 ttl pk-yrs)     Types: Cigarettes     Start date: 2016     Quit date: 2020     Years since quittin.3    Smokeless tobacco: Never   Vaping Use    Vaping status: Never Used   Substance and Sexual Activity    Alcohol use: Yes     Alcohol/week: 6.0 standard drinks of alcohol     Types: 3 Glasses of wine, 3 Standard drinks or equivalent per week     Comment: 3-4 drinks per week    Drug use: No   Other Topics Concern    Caffeine Concern Yes     Comment: 1 daily    Exercise Yes     Comment: 5 days a week    Seat Belt Yes     Social Drivers of Health     Food Insecurity: No Food Insecurity (2025)    NCSS - Food Insecurity     Worried About Running Out of Food in the Last Year: No     Ran Out of Food in the Last Year: No   Transportation Needs: No Transportation Needs (2025)    NCSS - Transportation     Lack of Transportation: No   Housing Stability: Not At Risk (2025)    NCSS - Housing/Utilities     Has Housing: Yes     Worried About Losing Housing: No     Unable to Get Utilities: No

## 2025-08-14 DIAGNOSIS — F41.1 GAD (GENERALIZED ANXIETY DISORDER): ICD-10-CM

## 2025-08-19 RX ORDER — LORAZEPAM 1 MG/1
1.5 TABLET ORAL EVERY 6 HOURS PRN
Qty: 135 TABLET | Refills: 2 | Status: SHIPPED | OUTPATIENT
Start: 2025-08-19

## (undated) NOTE — ED AVS SNAPSHOT
Prosper Haynes   MRN: [id number]    Department:  [address] [facility] in [place]   Date of Visit:  5/16/2018           Disclosure     Insurance plans vary and the physician(s) referred by the ER may not be covered by your plan.  Please c tell this physician (or your personal doctor if your instructions are to return to your personal doctor) about any new or lasting problems. The primary care or specialist physician will see patients referred from the [facility] Emergency Department.  Leonard Hernadez